# Patient Record
Sex: FEMALE | Race: BLACK OR AFRICAN AMERICAN | NOT HISPANIC OR LATINO | Employment: FULL TIME | URBAN - METROPOLITAN AREA
[De-identification: names, ages, dates, MRNs, and addresses within clinical notes are randomized per-mention and may not be internally consistent; named-entity substitution may affect disease eponyms.]

---

## 2018-12-07 ENCOUNTER — HOSPITAL ENCOUNTER (EMERGENCY)
Facility: HOSPITAL | Age: 33
Discharge: HOME | End: 2018-12-07
Attending: EMERGENCY MEDICINE | Admitting: EMERGENCY MEDICINE

## 2018-12-07 VITALS
TEMPERATURE: 98.5 F | DIASTOLIC BLOOD PRESSURE: 81 MMHG | WEIGHT: 186 LBS | SYSTOLIC BLOOD PRESSURE: 131 MMHG | HEIGHT: 63 IN | BODY MASS INDEX: 32.96 KG/M2 | OXYGEN SATURATION: 96 % | RESPIRATION RATE: 18 BRPM | HEART RATE: 95 BPM

## 2018-12-07 DIAGNOSIS — L30.9 ACUTE DERMATITIS: Primary | ICD-10-CM

## 2018-12-07 PROCEDURE — 99282 EMERGENCY DEPT VISIT SF MDM: CPT

## 2018-12-07 RX ORDER — FLUTICASONE PROPIONATE 50 MCG
2 SPRAY, SUSPENSION (ML) NASAL DAILY
COMMUNITY

## 2018-12-07 RX ORDER — LORATADINE 10 MG/1
10 CAPSULE, LIQUID FILLED ORAL DAILY
COMMUNITY

## 2018-12-07 RX ORDER — CETIRIZINE HYDROCHLORIDE 10 MG/1
10 TABLET ORAL DAILY
COMMUNITY

## 2018-12-07 NOTE — ED PROVIDER NOTES
Subjective   33-year-old black female complaining of rash on left ring finger for several months.  Patient states that in the past, she was diagnosed with an ear infection, took her antibiotics but continues to have a rash on this finger.  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  At one point, she was told by the emergency department that she had a fungal infection and was told to not covering glass and to use antifungal medication that was recommended.  She states she has done that but continues to exist.  She denies seeing a dermatologist or following up with anyone in the office and states that she has seen emergency departments for this.  She denies any fever, other rash, recent travel, or other complaints.        History provided by:  Patient  Rash   Location:  Finger  Finger rash location:  L long finger  Quality: dryness    Severity:  Moderate  Onset quality:  Unable to specify  Timing:  Constant  Progression:  Worsening  Chronicity:  New  Context: not medications and not sick contacts    Relieved by:  Nothing  Worsened by:  Nothing  Ineffective treatments:  None tried  Associated symptoms: no fever and no joint pain        Review of Systems   Constitutional: Negative for fever.   Musculoskeletal: Negative for arthralgias.   Skin: Positive for rash.   All other systems reviewed and are negative.      Past Medical History:   Diagnosis Date   • FHx: allergies        Allergies   Allergen Reactions   • Penicillins Other (See Comments)     Pt was a toddler, doesn't know       History reviewed. No pertinent surgical history.    History reviewed. No pertinent family history.    Social History     Socioeconomic History   • Marital status: Single     Spouse name: Not on file   • Number of children: Not on file   • Years of education: Not on file   • Highest education level: Not on file   Tobacco Use   • Smoking status: Current  Every Day Smoker     Packs/day: 0.25     Types: Cigarettes   Substance and Sexual Activity   • Alcohol use: Yes     Comment: socially   • Drug use: No   • Sexual activity: Defer           Objective   Physical Exam   Constitutional: She appears well-developed and well-nourished.   HENT:   Head: Normocephalic and atraumatic.   Eyes: Conjunctivae are normal.   Pulmonary/Chest: Effort normal.   Skin: Skin is warm and dry. Capillary refill takes less than 2 seconds.   Left hand: Left ring finger, there is a circumferential area of dry skin with small papules.  There is no erythema or warmth.  There is no streaking redness.  There is no swelling.  Neurovascular status is normal.  She has normal range of motion as well.  The remainder of the upper extremity exam is normal.  She does have dry skin to her face with no erythema.   Psychiatric: She has a normal mood and affect. Her behavior is normal.   Nursing note and vitals reviewed.      Procedures           ED Course                  MDM      Final diagnoses:   Acute dermatitis            Arie Wilson PA  12/07/18 9214

## 2018-12-07 NOTE — DISCHARGE INSTRUCTIONS
Keep area dry when possible.  You will need to apply Tinactin cream in the morning and 1% hydrocortisone ointment at night.  Do this for 1 week.  Make sure the areas completely dry before applying.  Return if fever, redness or problems sooner.